# Patient Record
Sex: FEMALE | Race: BLACK OR AFRICAN AMERICAN | HISPANIC OR LATINO | Employment: UNEMPLOYED | URBAN - METROPOLITAN AREA
[De-identification: names, ages, dates, MRNs, and addresses within clinical notes are randomized per-mention and may not be internally consistent; named-entity substitution may affect disease eponyms.]

---

## 2019-02-10 ENCOUNTER — APPOINTMENT (EMERGENCY)
Dept: CT IMAGING | Facility: HOSPITAL | Age: 54
End: 2019-02-10
Payer: COMMERCIAL

## 2019-02-10 ENCOUNTER — HOSPITAL ENCOUNTER (EMERGENCY)
Facility: HOSPITAL | Age: 54
Discharge: HOME | End: 2019-02-10
Attending: EMERGENCY MEDICINE
Payer: COMMERCIAL

## 2019-02-10 VITALS
TEMPERATURE: 97.8 F | BODY MASS INDEX: 38.32 KG/M2 | HEIGHT: 64 IN | SYSTOLIC BLOOD PRESSURE: 125 MMHG | OXYGEN SATURATION: 98 % | RESPIRATION RATE: 12 BRPM | DIASTOLIC BLOOD PRESSURE: 86 MMHG | HEART RATE: 60 BPM | WEIGHT: 224.43 LBS

## 2019-02-10 DIAGNOSIS — G44.209 TENSION HEADACHE: Primary | ICD-10-CM

## 2019-02-10 PROCEDURE — 96374 THER/PROPH/DIAG INJ IV PUSH: CPT

## 2019-02-10 PROCEDURE — 96361 HYDRATE IV INFUSION ADD-ON: CPT

## 2019-02-10 PROCEDURE — 70450 CT HEAD/BRAIN W/O DYE: CPT

## 2019-02-10 PROCEDURE — 96375 TX/PRO/DX INJ NEW DRUG ADDON: CPT

## 2019-02-10 PROCEDURE — 99284 EMERGENCY DEPT VISIT MOD MDM: CPT

## 2019-02-10 RX ORDER — KETOROLAC TROMETHAMINE 30 MG/ML
30 INJECTION, SOLUTION INTRAMUSCULAR; INTRAVENOUS ONCE
Status: COMPLETED | OUTPATIENT
Start: 2019-02-10 | End: 2019-02-10

## 2019-02-10 RX ORDER — IBUPROFEN 600 MG/1
600 TABLET ORAL EVERY 6 HOURS PRN
Qty: 30 TABLET | Refills: 0 | Status: SHIPPED | OUTPATIENT
Start: 2019-02-10 | End: 2019-03-04

## 2019-02-10 RX ORDER — DIAZEPAM 5 MG/ML
5 INJECTION, SOLUTION INTRAMUSCULAR; INTRAVENOUS ONCE
Status: COMPLETED | OUTPATIENT
Start: 2019-02-10 | End: 2019-02-10

## 2019-02-10 RX ORDER — DIAZEPAM 5 MG/1
5 TABLET ORAL EVERY 8 HOURS PRN
Qty: 12 TABLET | Refills: 0 | Status: SHIPPED | OUTPATIENT
Start: 2019-02-10 | End: 2019-03-04

## 2019-02-10 RX ADMIN — Medication 5 MG: at 09:33

## 2019-02-10 RX ADMIN — KETOROLAC TROMETHAMINE 30 MG: 30 INJECTION, SOLUTION INTRAMUSCULAR at 09:33

## 2019-02-10 RX ADMIN — SODIUM CHLORIDE 1000 ML: 0.9 INJECTION, SOLUTION INTRAVENOUS at 09:33

## 2019-02-10 NOTE — ED PROVIDER NOTES
History  Chief Complaint   Patient presents with    Headache     Patient c/o posterior neck pain along with a headache for a few days  47 y o  female presents to the ED with chief complaint of headache  Onset of symptoms reported as 3 days ago  Location of symptoms reported as the back of head  Quality is reported as tight pain  Severity is reported as moderate  Associated symptoms:  Denies nausea  Denies fevers, denies neck pain, denies rash, denies blurred vision or loss of vision  Denies photophobia, Denies unilateral extremity paralysis, paraesthesias or weakness  Denies facial droop or slurred speech  Denies chest pain, denies syncope  Modifiers:  Patient reports she started with tightness in her neck and upper shoulders and back which has now spread up and around her head  movement seems to exacerbate symptoms  Tried motrin at home without relief  ContexT:  Denies recent fall, injury or trauma to the area  Patient denies that headache was maximal intensity at onset and denies that his headache is worst headache of life  Medical summary: Review of past visit history via EPIC demonstrates no prior visits to this ed  History provided by:  Patient   used: No    Headache   Associated symptoms: no abdominal pain, no back pain, no congestion, no cough, no diarrhea, no dizziness, no drainage, no ear pain, no eye pain, no fatigue, no fever, no hearing loss, no myalgias, no nausea, no neck pain, no neck stiffness, no numbness, no photophobia, no seizures, no sinus pressure, no sore throat, no vomiting and no weakness        None       History reviewed  No pertinent past medical history  Past Surgical History:   Procedure Laterality Date     SECTION         History reviewed  No pertinent family history  I have reviewed and agree with the history as documented      Social History     Tobacco Use    Smoking status: Never Smoker    Smokeless tobacco: Never Used Substance Use Topics    Alcohol use: Never     Frequency: Never    Drug use: Never        Review of Systems   Constitutional: Negative for activity change, appetite change, chills, diaphoresis, fatigue and fever  HENT: Negative for congestion, dental problem, drooling, ear discharge, ear pain, facial swelling, hearing loss, mouth sores, nosebleeds, postnasal drip, rhinorrhea, sinus pressure, sinus pain, sneezing, sore throat, tinnitus, trouble swallowing and voice change  Eyes: Negative for photophobia, pain, discharge, redness and itching  Respiratory: Negative for cough, chest tightness, shortness of breath and wheezing  Cardiovascular: Negative for chest pain, palpitations and leg swelling  Gastrointestinal: Negative for abdominal pain, constipation, diarrhea, nausea and vomiting  Endocrine: Negative for cold intolerance, heat intolerance, polydipsia, polyphagia and polyuria  Genitourinary: Negative for decreased urine volume, difficulty urinating, dysuria, flank pain, frequency, hematuria and urgency  Musculoskeletal: Positive for arthralgias  Negative for back pain, joint swelling, myalgias, neck pain and neck stiffness  Skin: Negative for color change, pallor, rash and wound  Allergic/Immunologic: Negative for environmental allergies, food allergies and immunocompromised state  Neurological: Positive for headaches  Negative for dizziness, tremors, seizures, syncope, facial asymmetry, speech difficulty, weakness, light-headedness and numbness  Hematological: Negative for adenopathy  Does not bruise/bleed easily  Psychiatric/Behavioral: Negative for agitation, confusion, decreased concentration and hallucinations  The patient is not nervous/anxious  All other systems reviewed and are negative  Physical Exam  Physical Exam   Constitutional: She is oriented to person, place, and time  She appears well-developed and well-nourished  No distress     /74 (BP Location: Right arm)   Pulse 74   Temp 97 8 °F (36 6 °C) (Oral)   Resp 18   Ht 5' 4" (1 626 m)   Wt 102 kg (224 lb 6 9 oz)   SpO2 98%   BMI 38 52 kg/m²    HENT:   Head: Normocephalic and atraumatic  Right Ear: External ear normal    Left Ear: External ear normal    Nose: Nose normal    Mouth/Throat: Oropharynx is clear and moist  No oropharyngeal exudate  Eyes: Pupils are equal, round, and reactive to light  Conjunctivae and EOM are normal  Right eye exhibits no discharge  Left eye exhibits no discharge  No scleral icterus  Neck: Normal range of motion  Neck supple  No JVD present  No tracheal deviation present  No thyromegaly present  There is tenderness to palpation to bilateral cervical paraspinal muscles and trapezius muscles  No posterior midline cervical spinal tenderness to palpation  No bony step-offs or deformities on palpation  No nuchal rigidity or meningismus  Cardiovascular: Normal rate, regular rhythm and intact distal pulses  Pulmonary/Chest: Effort normal and breath sounds normal  No stridor  No respiratory distress  She has no wheezes  She has no rales  She exhibits no tenderness  Abdominal: Soft  Bowel sounds are normal  She exhibits no distension and no mass  There is no tenderness  There is no rebound and no guarding  No hernia  Musculoskeletal: Normal range of motion  She exhibits no edema, tenderness or deformity  Lymphadenopathy:     She has no cervical adenopathy  Neurological: She is alert and oriented to person, place, and time  She displays normal reflexes  No cranial nerve deficit or sensory deficit  She exhibits normal muscle tone  Coordination normal    Skin: Skin is warm and dry  Capillary refill takes less than 2 seconds  No rash noted  She is not diaphoretic  No erythema  No pallor  Psychiatric: She has a normal mood and affect  Her behavior is normal  Judgment and thought content normal    Nursing note and vitals reviewed        Vital Signs  ED Triage Vitals Temperature Pulse Respirations Blood Pressure SpO2   02/10/19 0849 02/10/19 0849 02/10/19 0849 02/10/19 0849 02/10/19 0849   97 8 °F (36 6 °C) 74 18 145/74 98 %      Temp Source Heart Rate Source Patient Position - Orthostatic VS BP Location FiO2 (%)   02/10/19 0849 02/10/19 0849 02/10/19 0849 02/10/19 0849 --   Oral Monitor Lying Right arm       Pain Score       02/10/19 0853       8           Vitals:    02/10/19 1000 02/10/19 1015 02/10/19 1030 02/10/19 1100   BP: 164/79  153/71 125/86   Pulse: 67 65 61 60   Patient Position - Orthostatic VS:           Visual Acuity  Visual Acuity      Most Recent Value   L Pupil Size (mm)  3   R Pupil Size (mm)  3          ED Medications  Medications   sodium chloride 0 9 % bolus 1,000 mL (0 mL Intravenous Stopped 2/10/19 1105)   ketorolac (TORADOL) injection 30 mg (30 mg Intravenous Given 2/10/19 0933)   diazepam (VALIUM) injection 5 mg (5 mg Intravenous Given 2/10/19 0610)       Diagnostic Studies  Results Reviewed     Procedure Component Value Units Date/Time    POCT pregnancy, urine [[id number]]     Lab Status:  No result                  CT head without contrast   Final Result by Gentry Reilly MD (02/10 0957)      No acute intracranial abnormality  Workstation performed: [id number]                    Procedures  Procedures       Phone Contacts  ED Phone Contact    ED Course  ED Course as of Feb 10 1137   Sun Feb 10, 2019   1104 Re-evaluation patient reports she feels improved after muscle relaxers and nsaids  Discussed ct scan results with patient at bedside  Ct head images independently visualized by me  Radiology report reviewed: no acute intracranial abnormality  Discussed diagnosis of tension headache, discussed treatment plan including nsaids and muscle relaxers, outpatient follow up with pcp and neurology for recheck in 3-5 days  Reviewed reasons to return to ed    Patient verbalized understanding of diagnosis and agreement with discharge plan of care as well as understanding of reasons to return to ed  MDM  Number of Diagnoses or Management Options  Diagnosis management comments: ddx includes but is not limited to tension headache, migraine headache, cluster headache, sinusitis, SAH, SDH, doubt temporal arteritis due to lack of unilateral temporal location of pain, doubt intracranial hemorrhage, doubt meningitis due to lack of fever/nuchal rigidity  ED course:  Patient received muscle relaxers and anti-inflammatories in the emergency department  Her head CT images were visualized by me  Radiology report was reviewed  No acute intracranial abnormality  After period of observation medication in the emergency department patient reports her symptoms were resolved  She feels improved  Discussed diagnosis of tension headache  Discussed treatment plan including NSAIDs, rest, use of muscle relaxers, outpatient follow up with primary care physician and Neurology for further evaluation treatment of symptoms  Reviewed reasons to return to ed  Patient verbalized understanding of diagnosis and agreement with discharge plan of care as well as understanding of reasons to return to ed            Amount and/or Complexity of Data Reviewed  Tests in the radiology section of CPT®: ordered and reviewed  Discussion of test results with the performing providers: yes  Review and summarize past medical records: yes  Independent visualization of images, tracings, or specimens: yes    Patient Progress  Patient progress: improved      Disposition  Final diagnoses:   Tension headache     Time reflects when diagnosis was documented in both MDM as applicable and the Disposition within this note     Time User Action Codes Description Comment    2/10/2019 11:07 AM Tresia [address] Landry Add [N82 [id number]] Tension headache       ED Disposition     ED Disposition Condition Date/Time Comment    Discharge Stable Sun Feb 10, 2019 11:07 AM Ted Jurado discharge to home/self care  Follow-up Information     Follow up With Specialties Details Why Contact Info Additional [address] Emergency Department Emergency Medicine Go to  If symptoms worsen, for further evaluation of symptoms [address]  [address] [facility] [address] ED, [address] [facility], [place], [place], [address] Alexandria Muhammad MD Internal Medicine Call in 1 day for further evaluation of symptoms [address] [facility]       Christina Mackay MD Neurology Call in 2 days for further evaluation of symptoms [address]   [phone]             Patient's Medications   Discharge Prescriptions    DIAZEPAM (VALIUM) 5 MG TABLET    Take 1 tablet (5 mg total) by mouth every 8 (eight) hours as needed for muscle spasms (initial rx ) for up to 10 days       Start Date: 2/10/2019 End Date: 2/20/2019       Order Dose: 5 mg       Quantity: 12 tablet    Refills: 0    IBUPROFEN (MOTRIN) 600 MG TABLET    Take 1 tablet (600 mg total) by mouth every 6 (six) hours as needed for moderate pain or headaches for up to 5 days       Start Date: 2/10/2019 End Date: 2/15/2019       Order Dose: 600 mg       Quantity: 30 tablet    Refills: 0     No discharge procedures on file      ED Provider  Electronically Signed by           Neo Arechiga PA-C  02/10/19 1537

## 2019-03-04 ENCOUNTER — HOSPITAL ENCOUNTER (EMERGENCY)
Facility: HOSPITAL | Age: 54
Discharge: HOME | End: 2019-03-05
Attending: EMERGENCY MEDICINE
Payer: COMMERCIAL

## 2019-03-04 ENCOUNTER — APPOINTMENT (EMERGENCY)
Dept: RADIOLOGY | Facility: HOSPITAL | Age: 54
End: 2019-03-04
Payer: COMMERCIAL

## 2019-03-04 DIAGNOSIS — T59.811A INJURY DUE TO SMOKE INHALATION: Primary | ICD-10-CM

## 2019-03-04 DIAGNOSIS — R06.00 DYSPNEA: ICD-10-CM

## 2019-03-04 LAB
ANION GAP SERPL CALCULATED.3IONS-SCNC: 8 MMOL/L (ref 4–13)
BASE EX.OXY STD BLDV CALC-SCNC: 61.8 % (ref 60–80)
BASE EXCESS BLDV CALC-SCNC: 1 MMOL/L
BASOPHILS # BLD AUTO: 0.01 THOUSANDS/ΜL (ref 0–0.1)
BASOPHILS NFR BLD AUTO: 0 % (ref 0–1)
BUN SERPL-MCNC: 12 MG/DL (ref 5–25)
CALCIUM SERPL-MCNC: 8.8 MG/DL (ref 8.3–10.1)
CHLORIDE SERPL-SCNC: 104 MMOL/L (ref 100–108)
CO2 SERPL-SCNC: 28 MMOL/L (ref 21–32)
CREAT SERPL-MCNC: 0.89 MG/DL (ref 0.6–1.3)
EOSINOPHIL # BLD AUTO: 0.05 THOUSAND/ΜL (ref 0–0.61)
EOSINOPHIL NFR BLD AUTO: 1 % (ref 0–6)
ERYTHROCYTE [DISTWIDTH] IN BLOOD BY AUTOMATED COUNT: 14.6 % (ref 11.6–15.1)
GAS + CO PNL BLDA: 3 % (ref 0–1.5)
GFR SERPL CREATININE-BSD FRML MDRD: 86 ML/MIN/1.73SQ M
GLUCOSE SERPL-MCNC: 144 MG/DL (ref 65–140)
HCO3 BLDV-SCNC: 27.1 MMOL/L (ref 24–30)
HCT VFR BLD AUTO: 34.5 % (ref 34.8–46.1)
HGB BLD-MCNC: 11.2 G/DL (ref 11.5–15.4)
IMM GRANULOCYTES # BLD AUTO: 0.01 THOUSAND/UL (ref 0–0.2)
IMM GRANULOCYTES NFR BLD AUTO: 0 % (ref 0–2)
LACTATE SERPL-SCNC: 1.4 MMOL/L (ref 0.5–2)
LYMPHOCYTES # BLD AUTO: 2.78 THOUSANDS/ΜL (ref 0.6–4.47)
LYMPHOCYTES NFR BLD AUTO: 49 % (ref 14–44)
MCH RBC QN AUTO: 25.1 PG (ref 26.8–34.3)
MCHC RBC AUTO-ENTMCNC: 32.5 G/DL (ref 31.4–37.4)
MCV RBC AUTO: 77 FL (ref 82–98)
MONOCYTES # BLD AUTO: 0.61 THOUSAND/ΜL (ref 0.17–1.22)
MONOCYTES NFR BLD AUTO: 11 % (ref 4–12)
NEUTROPHILS # BLD AUTO: 2.17 THOUSANDS/ΜL (ref 1.85–7.62)
NEUTS SEG NFR BLD AUTO: 39 % (ref 43–75)
NRBC BLD AUTO-RTO: 0 /100 WBCS
O2 CT BLDV-SCNC: 10.2 ML/DL
PCO2 BLDV: 49.8 MM HG (ref 42–50)
PH BLDV: 7.35 [PH] (ref 7.3–7.4)
PLATELET # BLD AUTO: 191 THOUSANDS/UL (ref 149–390)
PMV BLD AUTO: 10.7 FL (ref 8.9–12.7)
PO2 BLDV: 34.3 MM HG (ref 35–45)
POTASSIUM SERPL-SCNC: 3.5 MMOL/L (ref 3.5–5.3)
RBC # BLD AUTO: 4.46 MILLION/UL (ref 3.81–5.12)
SODIUM SERPL-SCNC: 140 MMOL/L (ref 136–145)
TROPONIN I SERPL-MCNC: <0.02 NG/ML
WBC # BLD AUTO: 5.63 THOUSAND/UL (ref 4.31–10.16)

## 2019-03-04 PROCEDURE — 85025 COMPLETE CBC W/AUTO DIFF WBC: CPT | Performed by: EMERGENCY MEDICINE

## 2019-03-04 PROCEDURE — 82375 ASSAY CARBOXYHB QUANT: CPT | Performed by: EMERGENCY MEDICINE

## 2019-03-04 PROCEDURE — 71046 X-RAY EXAM CHEST 2 VIEWS: CPT

## 2019-03-04 PROCEDURE — 99285 EMERGENCY DEPT VISIT HI MDM: CPT

## 2019-03-04 PROCEDURE — 82805 BLOOD GASES W/O2 SATURATION: CPT | Performed by: EMERGENCY MEDICINE

## 2019-03-04 PROCEDURE — 93005 ELECTROCARDIOGRAM TRACING: CPT

## 2019-03-04 PROCEDURE — 36415 COLL VENOUS BLD VENIPUNCTURE: CPT | Performed by: EMERGENCY MEDICINE

## 2019-03-04 PROCEDURE — 80048 BASIC METABOLIC PNL TOTAL CA: CPT | Performed by: EMERGENCY MEDICINE

## 2019-03-04 PROCEDURE — 84484 ASSAY OF TROPONIN QUANT: CPT | Performed by: EMERGENCY MEDICINE

## 2019-03-04 PROCEDURE — 83605 ASSAY OF LACTIC ACID: CPT | Performed by: EMERGENCY MEDICINE

## 2019-03-05 ENCOUNTER — APPOINTMENT (EMERGENCY)
Dept: CT IMAGING | Facility: HOSPITAL | Age: 54
End: 2019-03-05
Payer: COMMERCIAL

## 2019-03-05 VITALS
OXYGEN SATURATION: 95 % | HEIGHT: 64 IN | BODY MASS INDEX: 38.92 KG/M2 | WEIGHT: 227.96 LBS | SYSTOLIC BLOOD PRESSURE: 145 MMHG | HEART RATE: 78 BPM | DIASTOLIC BLOOD PRESSURE: 74 MMHG | TEMPERATURE: 97.9 F | RESPIRATION RATE: 17 BRPM

## 2019-03-05 LAB
ATRIAL RATE: 69 BPM
P AXIS: 69 DEGREES
PR INTERVAL: 184 MS
QRS AXIS: 48 DEGREES
QRSD INTERVAL: 84 MS
QT INTERVAL: 438 MS
QTC INTERVAL: 469 MS
T WAVE AXIS: 53 DEGREES
VENTRICULAR RATE: 69 BPM

## 2019-03-05 PROCEDURE — 93010 ELECTROCARDIOGRAM REPORT: CPT | Performed by: INTERNAL MEDICINE

## 2019-03-05 PROCEDURE — 71250 CT THORAX DX C-: CPT
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
## 2019-03-05 NOTE — ED NOTES
Walked pt around unit, pulse ox remained between 94-96%, provider made aware     Mariah Moreno RN  03/05/19 0512

## 2019-03-16 ENCOUNTER — APPOINTMENT (EMERGENCY)
Dept: CT IMAGING | Facility: HOSPITAL | Age: 54
End: 2019-03-16
Payer: COMMERCIAL

## 2019-03-16 ENCOUNTER — HOSPITAL ENCOUNTER (EMERGENCY)
Facility: HOSPITAL | Age: 54
Discharge: HOME | End: 2019-03-16
Attending: EMERGENCY MEDICINE | Admitting: EMERGENCY MEDICINE
Payer: COMMERCIAL

## 2019-03-16 VITALS
WEIGHT: 224.43 LBS | DIASTOLIC BLOOD PRESSURE: 99 MMHG | BODY MASS INDEX: 38.32 KG/M2 | HEART RATE: 78 BPM | HEIGHT: 64 IN | TEMPERATURE: 98.8 F | RESPIRATION RATE: 16 BRPM | OXYGEN SATURATION: 99 % | SYSTOLIC BLOOD PRESSURE: 168 MMHG

## 2019-03-16 DIAGNOSIS — R93.89 ABNORMAL CAT SCAN: ICD-10-CM
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
## 2019-03-16 DIAGNOSIS — M54.2 NECK PAIN: ICD-10-CM

## 2019-03-16 PROCEDURE — 72125 CT NECK SPINE W/O DYE: CPT

## 2019-03-16 PROCEDURE — 99284 EMERGENCY DEPT VISIT MOD MDM: CPT
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
